# Patient Record
Sex: MALE | Race: WHITE | URBAN - METROPOLITAN AREA
[De-identification: names, ages, dates, MRNs, and addresses within clinical notes are randomized per-mention and may not be internally consistent; named-entity substitution may affect disease eponyms.]

---

## 2017-08-24 ENCOUNTER — TELEPHONE (OUTPATIENT)
Dept: FAMILY MEDICINE | Facility: CLINIC | Age: 67
End: 2017-08-24

## 2017-08-24 NOTE — LETTER
September 7, 2017    Gavin Bunch  [address] [facility]  Update Address Returned Mail  [facility] [address]      Dear Gavin Bunch,     We have tried to contact you about your health, but have been unable to reach you.  Please call us as soon as possible so we can provide you with the best care possible.  We will continue to check in with you throughout the year to complete these items of care, if you are not able to complete these items at this time.  If you would like to complete the missing items for your care, please contact us at [phone].    We recommend the following:  -schedule a COLONOSCOPY to look for colon cancer (due every 10 years or 5 years in higher risk situations.)   Colon cancer is now the second leading cause of death in the [place] for both men and women and there are over 130,000 new cases and 50,000 deaths per year from colon cancer.  Colonoscopies can prevent 90-95% of these deaths.  Problem lesions can be removed before they ever become cancer.  This test is not only looking for cancer, but also getting rid of precancerious lesions.  If you do not wish to do a colonoscopy or cannot afford to do one, at this time, there is another option. It is called a FIT test. It is a take home stool sample kit that can be mailed to you to be mailed back.    Sincerely,     Your Care Team at [place]

## 2017-08-24 NOTE — TELEPHONE ENCOUNTER
Panel Management Review      Patient has the following on his problem list: None      Composite cancer screening  Chart review shows that this patient is due/due soon for the following Colonoscopy  Summary:    Patient is due/failing the following:   COLONOSCOPY    Action needed:   Patient needs referral/order: colonoscopy order pended    Type of outreach:    Sent [employer] message.    Questions for provider review:    None                                                                                                                                    Yen See KETAN Lindo     Chart routed to Care Team .

## 2017-08-31 NOTE — TELEPHONE ENCOUNTER
Called patient and was unable to reach him. Left voice message for patient to return call.  Yen See KETAN Lindo

## 2017-09-07 NOTE — TELEPHONE ENCOUNTER
Bex message not read. Called patient and was unable to reach him. Left message for patient to return call. Final letter sent.  Yen See KETAN Lindo

## 2018-01-24 ENCOUNTER — TELEPHONE (OUTPATIENT)
Dept: FAMILY MEDICINE | Facility: CLINIC | Age: 68
End: 2018-01-24

## 2018-01-24 NOTE — TELEPHONE ENCOUNTER
"Patient Communication Preferences indicate  Do not contact  and/or communication by \"Phone\" is not preferred. Call not required per Outreach team.        Outreach ,  Brittany Marley     "

## 2019-01-09 ENCOUNTER — OFFICE VISIT (OUTPATIENT)
Dept: FAMILY MEDICINE | Facility: CLINIC | Age: 69
End: 2019-01-09
Payer: COMMERCIAL

## 2019-01-09 ENCOUNTER — TELEPHONE (OUTPATIENT)
Dept: OTOLARYNGOLOGY | Facility: CLINIC | Age: 69
End: 2019-01-09

## 2019-01-09 VITALS
HEIGHT: 66 IN | BODY MASS INDEX: 26.68 KG/M2 | TEMPERATURE: 98.8 F | HEART RATE: 78 BPM | WEIGHT: 166 LBS | SYSTOLIC BLOOD PRESSURE: 150 MMHG | DIASTOLIC BLOOD PRESSURE: 80 MMHG

## 2019-01-09 DIAGNOSIS — I10 HTN, GOAL BELOW 150/90: ICD-10-CM

## 2019-01-09 DIAGNOSIS — R13.19 OTHER DYSPHAGIA: Primary | ICD-10-CM

## 2019-01-09 LAB
BASOPHILS # BLD AUTO: 0 10E9/L (ref 0–0.2)
BASOPHILS NFR BLD AUTO: 0.1 %
CRP SERPL-MCNC: 61 MG/L (ref 0–8)
DEPRECATED S PYO AG THROAT QL EIA: NORMAL
DIFFERENTIAL METHOD BLD: ABNORMAL
EOSINOPHIL # BLD AUTO: 0.1 10E9/L (ref 0–0.7)
EOSINOPHIL NFR BLD AUTO: 0.8 %
ERYTHROCYTE [DISTWIDTH] IN BLOOD BY AUTOMATED COUNT: 13 % (ref 10–15)
HCT VFR BLD AUTO: 42.5 % (ref 40–53)
HGB BLD-MCNC: 14.6 G/DL (ref 13.3–17.7)
LYMPHOCYTES # BLD AUTO: 0.7 10E9/L (ref 0.8–5.3)
LYMPHOCYTES NFR BLD AUTO: 7 %
MCH RBC QN AUTO: 34.8 PG (ref 26.5–33)
MCHC RBC AUTO-ENTMCNC: 34.4 G/DL (ref 31.5–36.5)
MCV RBC AUTO: 101 FL (ref 78–100)
MONOCYTES # BLD AUTO: 0.9 10E9/L (ref 0–1.3)
MONOCYTES NFR BLD AUTO: 8.3 %
NEUTROPHILS # BLD AUTO: 8.7 10E9/L (ref 1.6–8.3)
NEUTROPHILS NFR BLD AUTO: 83.8 %
PLATELET # BLD AUTO: 211 10E9/L (ref 150–450)
RBC # BLD AUTO: 4.2 10E12/L (ref 4.4–5.9)
SPECIMEN SOURCE: NORMAL
WBC # BLD AUTO: 10.3 10E9/L (ref 4–11)

## 2019-01-09 PROCEDURE — 85025 COMPLETE CBC W/AUTO DIFF WBC: CPT | Performed by: NURSE PRACTITIONER

## 2019-01-09 PROCEDURE — 84443 ASSAY THYROID STIM HORMONE: CPT | Performed by: NURSE PRACTITIONER

## 2019-01-09 PROCEDURE — 36415 COLL VENOUS BLD VENIPUNCTURE: CPT | Performed by: NURSE PRACTITIONER

## 2019-01-09 PROCEDURE — 86140 C-REACTIVE PROTEIN: CPT | Performed by: NURSE PRACTITIONER

## 2019-01-09 PROCEDURE — 87880 STREP A ASSAY W/OPTIC: CPT | Performed by: NURSE PRACTITIONER

## 2019-01-09 PROCEDURE — 80053 COMPREHEN METABOLIC PANEL: CPT | Performed by: NURSE PRACTITIONER

## 2019-01-09 PROCEDURE — 99215 OFFICE O/P EST HI 40 MIN: CPT | Performed by: NURSE PRACTITIONER

## 2019-01-09 PROCEDURE — 87081 CULTURE SCREEN ONLY: CPT | Performed by: NURSE PRACTITIONER

## 2019-01-09 ASSESSMENT — MIFFLIN-ST. JEOR: SCORE: 1457.78

## 2019-01-09 NOTE — TELEPHONE ENCOUNTER
After speaking with , his suggestion was that the patient needs to go to the ER for IV fluids for dehydration. This information was relayed to Malini and the provider at the [facility] who initially saw the patient. The provider took the information and 's name

## 2019-01-09 NOTE — PROGRESS NOTES
cbc  SUBJECTIVE:   Gavin Bunch is a 68 year old male who presents to clinic today for the following health issues:      THROAT PAIN/inability to swallow      Duration: Friday or Saturday, started to hurt when he swallowed Sunday night and monday AM    Description  sore throat, hurts to swallow, cannot even swallow medications now, he was taking advil and nyquil but had to stop. He denies any other respiratory symptoms.     Severity: moderate to severe throat pain     Accompanying signs and symptoms: None    History (predisposing factors):  none    Precipitating or alleviating factors: None    Therapies tried and outcome:  Was taking Nyquil but he is having a problem swallowing it, salt gargling that burns when he does it. Kills pain for about a 1/2 hour.     Three day history of throat pain followed by inability to swallow solids or liquids in the last 24 hours. He has some throat pain. No dysfunction in taste. No swollen lymph nodes. No hemoptysis. No unintentional weight loss. No gait abnormalities. Former tobacco smoker, quit in 2003. Drinks alcohol daily, 4 beers a day.     Has a history of hypertension but is not on medication. Says that he doesn't want to take medications.         Problem list and histories reviewed & adjusted, as indicated.  Additional history: as documented    Patient Active Problem List   Diagnosis     CARDIOVASCULAR SCREENING; LDL GOAL LESS THAN 160     Past Surgical History:   Procedure Laterality Date     HERNIA REPAIR, INGUINAL RT/LT  05/01    RT       Social History     Tobacco Use     Smoking status: Former Smoker     Types: Cigarettes     Last attempt to quit: 5/5/2003     Years since quitting: 15.6   Substance Use Topics     Alcohol use: Yes     Comment: 4 beers per day     Family History   Problem Relation Age of Onset     Alzheimer Disease Mother      Arthritis Father      Heart Disease Brother      Cancer - colorectal Brother      Heart Disease Brother            Reviewed  "and updated as needed this visit by clinical staff       Reviewed and updated as needed this visit by Provider         ROS:  Constitutional, HEENT, cardiovascular, pulmonary, GI, , musculoskeletal, neuro, skin, endocrine and psych systems are negative, except as otherwise noted.    OBJECTIVE:     /80   Pulse 78   Temp 98.8  F (37.1  C) (Oral)   Ht 1.664 m (5' 5.5\")   Wt 75.3 kg (166 lb)   BMI 27.20 kg/m    Body mass index is 27.2 kg/m .  GENERAL: healthy, alert and no distress  NECK: no adenopathy, no asymmetry, masses, or scars and thyroid normal to palpation  ENT: throat is erythematic. Tonsils are +2, uvula midline. Oropharynx is not crowded. There are no visible abscesses seen.   RESP: lungs clear to auscultation - no rales, rhonchi or wheezes  CV: regular rate and rhythm, normal S1 S2, no S3 or S4, no murmur, click or rub, no peripheral edema and peripheral pulses strong  ABDOMEN: soft, nontender, no hepatosplenomegaly, no masses and bowel sounds normal  MS: no gross musculoskeletal defects noted, no edema  NEURO: Normal strength and tone, sensory exam grossly normal, light touch and pinprick normal, mentation intact, cranial nerves 2-12 intact, DTR's normal and symmetric +2 upper , gait normal including heel/toe/tandem walking and Romberg normal  PSYCH: mentation appears normal, affect normal/bright    Diagnostic Test Results:  Results for orders placed or performed in visit on 01/09/19 (from the past 24 hour(s))   Rapid strep screen   Result Value Ref Range    Specimen Description Throat     Rapid Strep A Screen       NEGATIVE: No Group A streptococcal antigen detected by immunoassay, await culture report.       ASSESSMENT/PLAN:   (R13.19) Other dysphagia  (primary encounter diagnosis)  Comment: acute onset in the last 24-48 hours of throat pain followed by inability to swallow solids/liquids.Throat is erythematic but without visible signs of an abscess.  No palpable masses or lymphadenopathy " noted on exam.  Patient tried to drink water in office and began to cough and choke. Pt is a former tobacco smoker and consumes alcohol daily. WBC returned within normal range.   Plan: very concerning symptoms and signs on exam He needs urgent referral to ENT to r/o obstruction, malignancy, infectious process Provider requested TC to call ENT clinic to see if pt could be seen today. Dr. Roosevelt Garcia.  Dr. Garcia (ENT)  advised that patient should be evaluated in the ED patient requested to go to.  [facility] emergency department, he is on his way there now.  He will follow-up with us.  OTOLARYNGOLOGY REFERRAL, CBC with platelets and        differential, Comprehensive metabolic panel         (BMP + Alb, Alk Phos, ALT, AST, Total. Bili,         TP), TSH with free T4 reflex    (I10) HTN, goal below 150/90  Comment: uncontrolled and not on medication   Plan: strongly recommended that pt start medication. Refused.       NEIL Martin [facility]

## 2019-01-09 NOTE — TELEPHONE ENCOUNTER
Reason for call:  Other     Patient called regarding (reason for call): call back    Additional comments: Malini from [facility] is calling because the patient can not swallow any fluids and the ENT schedule is full for the week. Please call back asap     Phone number to reach patient:  Other phone number:  [phone]    Best Time:  any    Can we leave a detailed message on this number?  YES

## 2019-01-09 NOTE — PATIENT INSTRUCTIONS
Please go to ED for urgent evaluation of your inability to swallow. Your WBC was normal. Rapid strep was normal. I spoke to ENT with [facility] and they recommend that you go to the ED as well.     [facility]   Discharged by : Eric Velez MA  Paper scripts provided to patient : none     If you have any questions regarding your visit please contact your care team:     Team Gold                Clinic Hours Telephone Number     Dr. Alexia Moore, CNP 7am-7pm  Monday - Thursday   7am-5pm  Fridays  [phone]   (Appointment scheduling available 24/7)     RN Line  [phone] option 2     Urgent Care - [facility] - 11am-9pm Monday-Friday Saturday-Sunday- 9am-5pm     [place] -   5pm-9pm Monday-Friday Saturday-Sunday- 9am-5pm    [phone] - [facility]    [phone] - [place]     For a Price Quote for your services, please call our Consumer Price Line at [phone].     What options do I have for visits at the clinic other than the traditional office visit?     To expand how we care for you, many of our providers are utilizing electronic visits (e-visits) and telephone visits, when medically appropriate, for interactions with their patients rather than a visit in the clinic. We also offer nurse visits for many medical concerns. Just like any other service, we will bill your insurance company for this type of visit based on time spent on the phone with your provider. Not all insurance companies cover these visits. Please check with your medical insurance if this type of visit is covered. You will be responsible for any charges that are not paid by your insurance.   E-visits via [employer]: generally incur a $35.00 fee.     Telephone visits:  Time spent on the phone: *charged based on time that is spent on the phone in increments of 10 minutes. Estimated cost:   5-10 mins $30.00   11-20 mins. $59.00    21-30 mins. $85.00     Use PromptCare (secure email communication and access to your chart) to send your primary care provider a message or make an appointment. Ask someone on your Team how to sign up for PromptCare.     As always, Thank you for trusting us with your health care needs!    [facility]:    Scheduling Appointments  [facility]  Call: [phone]    [facility]  Call: [phone]    [facility]  Call: [phone]    For Gastroenterology referrals   [facility]   Clinics and Surgery Center, 4th Floor   [address] [place] [address]   Appointments: [phone]    WHERE TO GO FOR CARE?  Clinic    Make an appointment if you:       Are sick (cold, cough, flu, sore throat, earache or in pain).       Have a small injury (sprain, small cut, burn or broken bone).       Need a physical exam, Pap smear, vaccine or prescription refill.       Have questions about your health or medicines.    To reach us:      Call [phone] ([phone]). Open 24 hours every day. (For counseling services, call [phone].)    Log into PromptCare at [email or web address]. (Visit [email or web address] to create an account.) Hospital emergency room    An emergency is a serious or life- threatening problem that must be treated right away.    Call 023 or get to the hospital if you have:      Very bad or sudden:            - Chest pain or pressure         - Bleeding         - Head or belly pain         - Dizziness or trouble seeing, walking or                          Speaking      Problems breathing      Blood in your vomit or you are coughing up blood      A major injury (knocked out, loss of a finger or limb, rape, broken bone protruding from skin)    A mental health crisis. (Or call the Mental Health Crisis line at 1-405.223.5151 or Suicide Prevention Hotline at 1-319.989.8960.)    Open 24 hours every day. You  don't need an appointment.     Urgent care    Visit urgent care for sickness or small injuries when the clinic is closed. You don't need an appointment. To check hours or find an urgent care near you, visit [email or web address]. Online care    Get online care from [facility] for more than 70 common problems, like colds, allergies and infections. Open 24 hours every day at:   [email or web address]   Need help deciding?    For advice about where to be seen, you may call your clinic and ask to speak with a nurse. We're here for you 24 hours every day.         If you are deaf or hard of hearing, please let us know. We provide many free services including sign language interpreters, oral interpreters, TTYs, telephone amplifiers, note takers and written materials.

## 2019-01-10 LAB
ALBUMIN SERPL-MCNC: 4.1 G/DL (ref 3.4–5)
ALP SERPL-CCNC: 65 U/L (ref 40–150)
ALT SERPL W P-5'-P-CCNC: 24 U/L (ref 0–70)
ANION GAP SERPL CALCULATED.3IONS-SCNC: 8 MMOL/L (ref 3–14)
AST SERPL W P-5'-P-CCNC: 19 U/L (ref 0–45)
BACTERIA SPEC CULT: NORMAL
BILIRUB SERPL-MCNC: 0.6 MG/DL (ref 0.2–1.3)
BUN SERPL-MCNC: 10 MG/DL (ref 7–30)
CALCIUM SERPL-MCNC: 9.5 MG/DL (ref 8.5–10.1)
CHLORIDE SERPL-SCNC: 105 MMOL/L (ref 94–109)
CO2 SERPL-SCNC: 27 MMOL/L (ref 20–32)
CREAT SERPL-MCNC: 0.94 MG/DL (ref 0.66–1.25)
GFR SERPL CREATININE-BSD FRML MDRD: 82 ML/MIN/{1.73_M2}
GLUCOSE SERPL-MCNC: 101 MG/DL (ref 70–99)
POTASSIUM SERPL-SCNC: 4.1 MMOL/L (ref 3.4–5.3)
PROT SERPL-MCNC: 7.8 G/DL (ref 6.8–8.8)
SODIUM SERPL-SCNC: 140 MMOL/L (ref 133–144)
SPECIMEN SOURCE: NORMAL
TSH SERPL DL<=0.005 MIU/L-ACNC: 1.75 MU/L (ref 0.4–4)

## 2019-01-18 ENCOUNTER — TELEPHONE (OUTPATIENT)
Dept: FAMILY MEDICINE | Facility: CLINIC | Age: 69
End: 2019-01-18

## 2019-01-18 NOTE — TELEPHONE ENCOUNTER
Spoke with patient, who reiterates his message below, states he has been able so to swallow fine since, and his sore throat resolved after 2 days.  [facility]    RN huddled with Yulisa Moore CNP to update. No further action required, so will close encounter at this time.    Shakira Mann RN

## 2019-01-18 NOTE — TELEPHONE ENCOUNTER
Reason for Call:  Other     Detailed comments: Patient calling to update provider. He was seen in the ER at the [facility]. They gave him steroids and pain medication. They also put patient on antibiotics. He stayed 2 nights. The first night, they did a CT scan. He states that after the scan he was able to swallow. They also did a scope. They thought it looked like it could have been pitting in there. 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 Patient has completed all medication treatment and follows up with them on Tuesday. He is doing better. Patient would like to say thanks to provider for her help.    Phone Number Patient can be reached at: Home number on file [phone] (home)    Best Time: any    Can we leave a detailed message on this number? YES    Call taken on 1/18/2019 at 11:43 AM by Nichole Temple

## 2019-03-14 ENCOUNTER — TELEPHONE (OUTPATIENT)
Dept: FAMILY MEDICINE | Facility: CLINIC | Age: 69
End: 2019-03-14

## 2019-03-14 NOTE — LETTER
March 14, 2019      Gavin Bunch  [address] [facility]  UPDATE ADDRESS RETURNED MAIL  [facility] [address]    Dear Gavin,     We care about your health and have reviewed your health plan. We have reviewed your medical conditions, medication list, and lab results and are making recommendations based on this review, to better manage your health.  You are in particular need of attention regarding:  - Your Cholesterol  - Scheduling a Colon Cancer Screening (Colonoscopy only) [phone]  - Scheduling a Wellness (Physical/Lab) Visit age 65 and older [phone]      Here is a list of Health Maintenance topics that are due now or due soon:  Health Maintenance Due   Topic Date Due     PHQ-2 Q1 YR  09/14/1962     HEPATITIS C SCREENING  09/14/1968     DTAP/TDAP/TD IMMUNIZATION (1 - Tdap) 09/14/1975     COLON CANCER SCREEN (SYSTEM ASSIGNED)  09/14/2000     ZOSTER IMMUNIZATION (1 of 2) 09/14/2000     ADVANCE DIRECTIVE PLANNING Q5 YRS  09/14/2005     LIPID SCREEN Q5 YR MALE (SYSTEM ASSIGNED)  05/05/2015     MEDICARE ANNUAL WELLNESS VISIT  09/14/2015     FALL RISK ASSESSMENT  09/14/2015     PNEUMOCOCCAL IMMUNIZATION 65+ LOW/MEDIUM RISK (1 of 2 - PCV13) 09/14/2015     AORTIC ANEURYSM SCREENING (SYSTEM ASSIGNED)  09/14/2015     INFLUENZA VACCINE (1) 09/01/2018     We will be calling you in the next couple of weeks to help you schedule any appointments that are needed.  Please call us at [phone] (or use FlagTap) to address the above recommendations.     Thank you for trusting [facility] with your healthcare needs. We appreciate the opportunity to serve you and look forward to supporting you in the future  Sincerely,  NEIL Martin CNP

## 2019-03-14 NOTE — TELEPHONE ENCOUNTER
Panel Management Review      Patient has the following on his problem list: None      Composite cancer screening  Chart review shows that this patient is due/due soon for the following Colonoscopy  Summary:    Patient is due/failing the following:   COLONOSCOPY, LDL and PHYSICAL    Action needed:   Patient needs office visit for physical.    Type of outreach:    Sent [employer] message. and Sent letter.    Questions for provider review:    None                                                                                                                                    Tanya Miranda CMA ([facility])

## 2019-07-18 ENCOUNTER — OFFICE VISIT (OUTPATIENT)
Dept: FAMILY MEDICINE | Facility: CLINIC | Age: 69
End: 2019-07-18
Payer: COMMERCIAL

## 2019-07-18 VITALS
OXYGEN SATURATION: 98 % | DIASTOLIC BLOOD PRESSURE: 88 MMHG | TEMPERATURE: 97 F | WEIGHT: 168 LBS | HEART RATE: 68 BPM | SYSTOLIC BLOOD PRESSURE: 180 MMHG | BODY MASS INDEX: 27.53 KG/M2

## 2019-07-18 DIAGNOSIS — Z23 NEED FOR TETANUS BOOSTER: ICD-10-CM

## 2019-07-18 DIAGNOSIS — M25.561 ACUTE PAIN OF RIGHT KNEE: ICD-10-CM

## 2019-07-18 DIAGNOSIS — Z11.59 ENCOUNTER FOR HEPATITIS C SCREENING TEST FOR LOW RISK PATIENT: Primary | ICD-10-CM

## 2019-07-18 DIAGNOSIS — Z12.11 SPECIAL SCREENING FOR MALIGNANT NEOPLASMS, COLON: ICD-10-CM

## 2019-07-18 PROCEDURE — 99213 OFFICE O/P EST LOW 20 MIN: CPT | Performed by: FAMILY MEDICINE

## 2019-07-18 NOTE — PROGRESS NOTES
Subjective     Gavin Bunch is a 68 year old male who presents to clinic today for the following health issues:    HPI   Tendon problem/pain      Duration: 2 months ago    Description  Location: underside of right knee    Intensity:  Was severe    Accompanying signs and symptoms: was swelling    History  Previous similar problem: no, knees are bad but not like this  Previous evaluation:  none    Precipitating or alleviating factors:  Trauma or overuse: Happened when he got off of a chair  Aggravating factors include: standing    Therapies tried and outcome: None    2 months ago was bending knee and felt like it locks up and gets irritated when rested and then starts to move   Has not given out   Has not locked   It hurts when it does this     O: /88 (BP Location: Right arm, Patient Position: Chair, Cuff Size: Adult Regular)   Pulse 68   Temp 97  F (36.1  C) (Oral)   Wt 76.2 kg (168 lb)   SpO2 98%   BMI 27.53 kg/m      Right knee exam   No swelling     There is no joint line pain in the medial lateral component.  No surrounding redness warmth or decreased range of motion.  Patient has full range of motion from 0-1 60.  Patient has really no tenderness present on exam including the posterior popliteal space where the patient states he is having pain.  No evidence of a Baker's cyst  Anterior posterior drawer signs are normal  Naty test is normal.  Patient has stable medial and lateral collateral ligaments.    Assessment: Knee pain without obvious cause    Plan: Refer to sports medicine for further evaluation

## 2019-11-09 ENCOUNTER — HEALTH MAINTENANCE LETTER (OUTPATIENT)
Age: 69
End: 2019-11-09

## 2020-01-14 ENCOUNTER — MYC MEDICAL ADVICE (OUTPATIENT)
Dept: FAMILY MEDICINE | Facility: CLINIC | Age: 70
End: 2020-01-14

## 2020-01-14 ENCOUNTER — TELEPHONE (OUTPATIENT)
Dept: FAMILY MEDICINE | Facility: CLINIC | Age: 70
End: 2020-01-14

## 2020-01-14 NOTE — TELEPHONE ENCOUNTER
Patient Quality Outreach      Summary:    Patient is due/failing the following:   BP check, Colonoscopy and Annual wellness, date due: 09/14/2015    Type of outreach:    Sent [employer] message.    Questions for provider review:    None                                                                                   Patient has the following on his problem list:     Hypertension   Last three blood pressure readings:  BP Readings from Last 3 Encounters:   07/18/19 180/88   01/09/19 150/80   10/17/16 160/82     Blood pressure: Failed    HTN Guidelines:  ? 139/89     **Start Working phrase here:**                                                 Naomi Cabrera,        Chart routed to Care Team.

## 2020-01-23 NOTE — TELEPHONE ENCOUNTER
Panel Management Review  Summary:    Type of outreach:    Phone, left message for patient to call back.     Encounter routed to No Action Needed.                                                                                                                               Naomi Cabrera,

## 2020-02-23 ENCOUNTER — HEALTH MAINTENANCE LETTER (OUTPATIENT)
Age: 70
End: 2020-02-23

## 2020-12-06 ENCOUNTER — HEALTH MAINTENANCE LETTER (OUTPATIENT)
Age: 70
End: 2020-12-06

## 2021-04-11 ENCOUNTER — HEALTH MAINTENANCE LETTER (OUTPATIENT)
Age: 71
End: 2021-04-11

## 2021-09-26 ENCOUNTER — HEALTH MAINTENANCE LETTER (OUTPATIENT)
Age: 71
End: 2021-09-26

## 2022-05-07 ENCOUNTER — HEALTH MAINTENANCE LETTER (OUTPATIENT)
Age: 72
End: 2022-05-07

## 2023-04-23 ENCOUNTER — HEALTH MAINTENANCE LETTER (OUTPATIENT)
Age: 73
End: 2023-04-23

## 2023-06-02 ENCOUNTER — HEALTH MAINTENANCE LETTER (OUTPATIENT)
Age: 73
End: 2023-06-02